# Patient Record
Sex: FEMALE | Race: BLACK OR AFRICAN AMERICAN | NOT HISPANIC OR LATINO | Employment: OTHER | URBAN - METROPOLITAN AREA
[De-identification: names, ages, dates, MRNs, and addresses within clinical notes are randomized per-mention and may not be internally consistent; named-entity substitution may affect disease eponyms.]

---

## 2023-01-01 ENCOUNTER — HOSPITAL ENCOUNTER (EMERGENCY)
Facility: HOSPITAL | Age: 83
End: 2023-11-21
Attending: EMERGENCY MEDICINE
Payer: COMMERCIAL

## 2023-01-01 DIAGNOSIS — I46.9 CARDIAC ARREST: Primary | ICD-10-CM

## 2023-01-01 LAB
ANION GAP BLDV CALCULATED.4IONS-SCNC: ABNORMAL MMOL/L
BASE EXCESS BLDV CALC-SCNC: ABNORMAL MMOL/L
BODY TEMPERATURE: 37 DEGREES CELSIUS
CA-I BLDV-SCNC: 0.28 MMOL/L (ref 1.1–1.33)
CHLORIDE BLDV-SCNC: 113 MMOL/L (ref 98–107)
GLUCOSE BLDV-MCNC: 134 MG/DL (ref 74–99)
HCO3 BLDV-SCNC: ABNORMAL MMOL/L
HCT VFR BLD EST: 15 % (ref 36–46)
HGB BLDV-MCNC: 4.9 G/DL (ref 12–16)
LACTATE BLDV-SCNC: 10.4 MMOL/L (ref 0.4–2)
OXYHGB MFR BLDV: 11.4 % (ref 45–75)
PCO2 BLDV: 69 MM HG (ref 41–51)
PH BLDV: <6.8 PH (ref 7.33–7.43)
PO2 BLDV: 22 MM HG (ref 35–45)
POTASSIUM BLDV-SCNC: 6.8 MMOL/L (ref 3.5–5.3)
SAO2 % BLDV: 11 % (ref 45–75)
SODIUM BLDV-SCNC: 141 MMOL/L (ref 136–145)

## 2023-01-01 PROCEDURE — 92960 CARDIOVERSION ELECTRIC EXT: CPT | Performed by: EMERGENCY MEDICINE

## 2023-01-01 PROCEDURE — 92950 HEART/LUNG RESUSCITATION CPR: CPT

## 2023-01-01 PROCEDURE — 99285 EMERGENCY DEPT VISIT HI MDM: CPT | Mod: 25

## 2023-01-01 PROCEDURE — 2500000004 HC RX 250 GENERAL PHARMACY W/ HCPCS (ALT 636 FOR OP/ED): Performed by: EMERGENCY MEDICINE

## 2023-01-01 PROCEDURE — 31500 INSERT EMERGENCY AIRWAY: CPT

## 2023-01-01 PROCEDURE — 99285 EMERGENCY DEPT VISIT HI MDM: CPT | Performed by: EMERGENCY MEDICINE

## 2023-01-01 PROCEDURE — 84132 ASSAY OF SERUM POTASSIUM: CPT

## 2023-01-01 PROCEDURE — 99284 EMERGENCY DEPT VISIT MOD MDM: CPT | Mod: 25 | Performed by: EMERGENCY MEDICINE

## 2023-01-01 PROCEDURE — 31500 INSERT EMERGENCY AIRWAY: CPT | Performed by: EMERGENCY MEDICINE

## 2023-01-01 PROCEDURE — 2500000005 HC RX 250 GENERAL PHARMACY W/O HCPCS

## 2023-01-01 RX ORDER — CALCIUM CHLORIDE INJECTION 100 MG/ML
INJECTION, SOLUTION INTRAVENOUS
Status: COMPLETED
Start: 2023-01-01 | End: 2023-01-01

## 2023-01-01 RX ORDER — EPINEPHRINE 0.1 MG/ML
INJECTION INTRACARDIAC; INTRAVENOUS CODE/TRAUMA/SEDATION MEDICATION
Status: COMPLETED | OUTPATIENT
Start: 2023-01-01 | End: 2023-01-01

## 2023-01-01 RX ORDER — INDOMETHACIN 25 MG/1
CAPSULE ORAL CODE/TRAUMA/SEDATION MEDICATION
Status: COMPLETED | OUTPATIENT
Start: 2023-01-01 | End: 2023-01-01

## 2023-01-01 RX ADMIN — EPINEPHRINE 1 MG: 0.1 INJECTION INTRAVENOUS at 06:50

## 2023-01-01 RX ADMIN — EPINEPHRINE 1 MG: 0.1 INJECTION INTRAVENOUS at 06:48

## 2023-01-01 RX ADMIN — EPINEPHRINE 1 MG: 0.1 INJECTION INTRAVENOUS at 06:55

## 2023-01-01 RX ADMIN — CALCIUM CHLORIDE: 100 INJECTION INTRAVENOUS; INTRAVENTRICULAR at 06:55

## 2023-01-01 RX ADMIN — SODIUM BICARBONATE 50 MEQ: 84 INJECTION, SOLUTION INTRAVENOUS at 06:53

## 2023-01-01 ASSESSMENT — LIFESTYLE VARIABLES: REASON UNABLE TO ASSESS: YES

## 2023-11-21 NOTE — ED PROCEDURE NOTE
Procedure  Intubation    Performed by: Josselyn Donovan DO  Authorized by: Roosevelt Blank MD MPH    Consent:     Consent obtained:  Emergent situation  Universal protocol:     Patient identity confirmed:  Anonymous protocol, patient vented/unresponsive  Pre-procedure details:     Indications: cardio/pulmonary arrest      Patient status:  Unresponsive    Look externally: no concerns      Mouth opening - incisor distance:  1 finger width    Hyoid-mental distance: 1 finger width      Hyoid-thyroid distance: 1 finger width      Mallampati score:  IV    Obstruction: none      Neck mobility: normal      Pharmacologic strategy: RSI      Induction agents:  None    Paralytics:  None  Procedure details:     Preoxygenation:  Supraglottic device    CPR in progress: yes      Number of attempts:  1  Successful intubation attempt details:     Intubation method:  Oral    Intubation technique: video assisted      Laryngoscope blade:  Hypercurved and Mac 4    Bougie used: no      Grade view: I      Tube size (mm):  7.5    Tube type:  Cuffed    Tube visualized through cords: yes    Placement assessment:     Tube secured with:  ETT ludwig    Breath sounds:  Equal    Placement verification: colorimetric ETCO2, numeric ETCO2 and waveform ETCO2    Post-procedure details:     Procedure completion:  Tolerated               Josselyn Donovan DO  Resident  11/21/23 0743

## 2023-11-21 NOTE — ED TRIAGE NOTES
Patient presented to the ED in cardiac arrest. EMS arrived to a nursing facility for a call for an unresponsive patient with a 15 minute downtime. CPR was started on their arrival. She was asystole on their monitor. She was given 3 epi by EMS and remained in asystole for EMS. They placed an IO to the left tib/fib. Blood sugar was 256 per EMS. Igel was placed by EMS

## 2023-11-21 NOTE — ED PROVIDER NOTES
HPI  Patient is an 83-year-old female presented to the emergency department as a cardiac arrest.  History limited secondary to patient's mentation therefore history obtained entirely from EMS.  Reportedly was found unresponsive at [facility], which is her [facility].  No pulse upon their evaluation therefore CPR was started by workers until EMS arrival.  Unknown downtime.  Approximately 15 minutes of downtime by EMS.  Igel in place.  Asystole throughout entire resuscitation.    Physical Exam  VITALS: Vital signs reviewed in nursing triage note, EMR flow sheets, and at patient's bedside  CONSTITUTIONAL: ACLS ongoing  HEAD: NCAT  EYES: Fixed and dilated  CARD: Active compressions, femoral pulse with compressions  RESP: Igel on arrival, bilateral breath sounds appreciated  ABD: Nondistended, soft on palpation  EXT: Left tibial IO in place  SKIN: Dry with appropriate turgor, no apparent rashes, suspicious lesions, or masses   NEURO: Unable to assess  PSYCH: Unable to assess    There were no vitals filed for this visit.     Assessment/Plan/MDM  Patient in critical condition upon arrival to the emergency department.  Noted to be in asystole on initial pulse check.  Was provided with epinephrine 1 mg IV upon arrival.  Right tibial IO was placed due to difficult IV access.  Patient intubated without difficulty; please see procedure note for full details.  Following intubation only achieved end-tidal's of 5-8.  Patient received additional 3 rounds of epinephrine 1 mg as well as 2 A of bicarb.  VBG was sent from the IO which demonstrated significant hyperkalemia as well as hypocalcemia in addition to EKG considerable respiratory acidosis and elevated lactate.  Suspect that the electrolyte derangements are from a prolonged downtime as no evidence of fistula or dialysis catheter were appreciated.  Received a total of calcium chloride 2 g IV both for potassium and calcium.  Subsequent pulse checks demonstrated asystole.  Given  unfavorable VBG, end-tidal's that were not prognostically favorable, and a nonshockable rhythm combined with an unknown total downtime as it could have been significantly longer than EMS transport as well as her resuscitation time, further resuscitative efforts were deemed futile and as such time of death was called at 0700.    Result  *See section(s) entitled ``Lab Results´´, ``Diagnostic Imaging Results Review´´ for entirety.  Notable results listed below  - Labs: VBG shows pH less than 6.80, potassium 6.8, iCal 0.28, lactate 10.4, hemoglobin 4.9    Clinical Impression  Cardiac arrest    Dispo:      Patient seen and discussed with attending physician Dr. Blank.    Tejas Rainey MD  Emergency Medicine, PGY-3    Was dictated using Dragon dictation. Please excuse any errors found in the note.     Tejas Rainey MD  Resident  [phone]

## 2023-11-26 NOTE — ED PROCEDURE NOTE
Procedure  Critical Care    Performed by: Roosevelt Blank MD MPH  Authorized by: Roosevelt Blank MD MPH    Critical care provider statement:     Critical care time (minutes):  25    Critical care was necessary to treat or prevent imminent or life-threatening deterioration of the following conditions:  Cardiac failure (cardiac arrest)    Critical care was time spent personally by me on the following activities:  Blood draw for specimens, evaluation of patient's response to treatment, examination of patient, gastric intubation, ordering and performing treatments and interventions, ordering and review of laboratory studies, pulse oximetry, re-evaluation of patient's condition and ventilator management    Care discussed with: admitting provider                 Roosevelt Blank MD MPH  11/26/23 8242

## 2024-03-26 LAB
LAB AP POST MORTEM HOURS: 54
LABORATORY COMMENT REPORT: NORMAL
PATH REPORT.ADDENDUM SPEC: NORMAL
PATH REPORT.FINAL DX SPEC: NORMAL
PATH REPORT.GROSS SPEC: NORMAL
PATH REPORT.MICROSCOPIC SPEC OTHER STN: NORMAL
PATH REPORT.RELEVANT HX SPEC: NORMAL
PATH REPORT.RELEVANT HX SPEC: NORMAL
PATH REPORT.TOTAL CANCER: NORMAL
RPT COMMENT SECTION: NORMAL